# Patient Record
Sex: FEMALE | Race: OTHER | Employment: FULL TIME | URBAN - METROPOLITAN AREA
[De-identification: names, ages, dates, MRNs, and addresses within clinical notes are randomized per-mention and may not be internally consistent; named-entity substitution may affect disease eponyms.]

---
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
## 2025-03-18 ENCOUNTER — ANCILLARY PROCEDURE (OUTPATIENT)
Dept: OBGYN CLINIC | Age: 20
End: 2025-03-18

## 2025-03-18 ENCOUNTER — INITIAL PRENATAL (OUTPATIENT)
Dept: OBGYN CLINIC | Age: 20
End: 2025-03-18

## 2025-03-18 VITALS
DIASTOLIC BLOOD PRESSURE: 69 MMHG | TEMPERATURE: 98 F | HEIGHT: 64 IN | HEART RATE: 86 BPM | RESPIRATION RATE: 14 BRPM | SYSTOLIC BLOOD PRESSURE: 110 MMHG | WEIGHT: 223 LBS | BODY MASS INDEX: 38.07 KG/M2

## 2025-03-18 DIAGNOSIS — Z03.75 SUSPECTED CERVICAL SHORTENING NOT FOUND: ICD-10-CM

## 2025-03-18 DIAGNOSIS — Z36.3 ENCOUNTER FOR ANTENATAL SCREENING FOR MALFORMATION USING ULTRASOUND: Primary | ICD-10-CM

## 2025-03-18 DIAGNOSIS — Z36.3 ENCOUNTER FOR ROUTINE SCREENING FOR FETAL MALFORMATION USING ULTRASOUND: ICD-10-CM

## 2025-03-18 LAB
GLUCOSE URINE, POC: NORMAL MG/DL
PROTEIN UA: NEGATIVE

## 2025-03-18 PROCEDURE — 76811 OB US DETAILED SNGL FETUS: CPT | Performed by: OBSTETRICS & GYNECOLOGY

## 2025-03-18 PROCEDURE — 99203 OFFICE O/P NEW LOW 30 MIN: CPT | Performed by: OBSTETRICS & GYNECOLOGY

## 2025-03-18 PROCEDURE — 99999 PR OFFICE/OUTPT VISIT,PROCEDURE ONLY: CPT | Performed by: OBSTETRICS & GYNECOLOGY

## 2025-03-18 PROCEDURE — 81002 URINALYSIS NONAUTO W/O SCOPE: CPT | Performed by: OBSTETRICS & GYNECOLOGY

## 2025-03-18 PROCEDURE — 76817 TRANSVAGINAL US OBSTETRIC: CPT | Performed by: OBSTETRICS & GYNECOLOGY

## 2025-03-18 RX ORDER — ACETAMINOPHEN 325 MG/1
650 TABLET ORAL EVERY 6 HOURS PRN
COMMUNITY

## 2025-03-18 NOTE — PROGRESS NOTES
3/18/25    Chaka Ortiz MD  [facility], [address] [place] [address]     RE:  SANDRA العلي  : 2005   AGE: 19 y.o.      Dear Dr. Ortiz:    Sandra العلي was scheduled for a fetal ultrasound assessment only.  A comprehensive ultrasound report is included under the imaging tab from today's date.    A living epps intrauterine fetus was identified in the breech presentation, with normal fetal heart motion and normal fetal motion noted.  The amniotic fluid volume was within normal limits.  The estimated fetal weight was 372 grams.  The placenta was on the anterior surface of the uterus.  A three-vessel umbilical cord was normally inserted into the placenta.  The biometric measurements were consistent with the patient's established dating parameters, within the limits of error the test at the current gestational age.  No apparent gross fetal anatomic abnormalities were identified in the areas which were visualized.  The spine was not adequately visualized secondary to the fetal position.    Ultrasound is not diagnostic for fetal aneuploidy or other karyotype abnormalities, and may not detect all structural fetal defects, even if multiple examinations are performed during a  pregnancy.  Normal ultrasound findings did not equate with a normal fetal outcome.    Transvaginal ultrasound assessment of the cervix was performed. The cervical length was 45.5 mm, without funneling of the amniotic membranes.      A repeat ultrasound evaluation is recommended in approximately 3 to 4 weeks to reassess the fetal anatomy not visualized on today's examination and reevaluate the fetal growth.    If you have any questions regarding her management, please contact me at your convenience and thank you for allowing me to participate in her care    Sincerely,        Mason Damon MD, MS, FACOG, FACS, RDCS, RDMS, RVT  Director Maternal-Fetal Medicine  [facility]  [phone]

## 2025-03-18 NOTE — PROGRESS NOTES
Patient is here today for ob new visit. Denies any vaginal bleeding,  or leakage of fluids.  Lower stomach tightness when she is laying down on one side.

## 2025-04-09 ENCOUNTER — ROUTINE PRENATAL (OUTPATIENT)
Dept: OBGYN CLINIC | Age: 20
End: 2025-04-09

## 2025-04-09 ENCOUNTER — ANCILLARY PROCEDURE (OUTPATIENT)
Dept: OBGYN CLINIC | Age: 20
End: 2025-04-09
Payer: MEDICAID

## 2025-04-09 VITALS
SYSTOLIC BLOOD PRESSURE: 99 MMHG | HEART RATE: 76 BPM | TEMPERATURE: 98.1 F | WEIGHT: 226 LBS | DIASTOLIC BLOOD PRESSURE: 63 MMHG | BODY MASS INDEX: 38.79 KG/M2

## 2025-04-09 DIAGNOSIS — O99.210 OBESITY AFFECTING PREGNANCY, ANTEPARTUM, UNSPECIFIED OBESITY TYPE: Primary | ICD-10-CM

## 2025-04-09 DIAGNOSIS — Z36.9 ENCOUNTER FOR FETAL ULTRASOUND: ICD-10-CM

## 2025-04-09 LAB
GLUCOSE URINE, POC: NORMAL MG/DL
PROTEIN UA: NEGATIVE

## 2025-04-09 PROCEDURE — 81002 URINALYSIS NONAUTO W/O SCOPE: CPT | Performed by: OBSTETRICS & GYNECOLOGY

## 2025-04-09 PROCEDURE — 99999 PR OFFICE/OUTPT VISIT,PROCEDURE ONLY: CPT | Performed by: OBSTETRICS & GYNECOLOGY

## 2025-04-09 PROCEDURE — 76816 OB US FOLLOW-UP PER FETUS: CPT | Performed by: OBSTETRICS & GYNECOLOGY

## 2025-04-09 PROCEDURE — 99213 OFFICE O/P EST LOW 20 MIN: CPT | Performed by: OBSTETRICS & GYNECOLOGY

## 2025-04-09 NOTE — PROGRESS NOTES
Patient is here today for US. Denies any vaginal bleeding, cramping, or leakage of fluids.  Patient reports good fetal movement.

## 2025-04-10 NOTE — PROGRESS NOTES
25    Chaka Ortiz MD  [facility], [address]  [place] [address]     RE:  SANDRA العلي  : 2005   AGE: 19 y.o.      Dear Dr. Ortiz:    Sandra العلي was scheduled for a fetal ultrasound assessment only.  A comprehensive ultrasound report is included under the imaging tab from today's date.    A living epps intrauterine fetus was identified in the cephalic presentation, with normal fetal heart motion and normal fetal motion noted.  The amniotic fluid volume was within normal limits.  The estimated fetal weight was 625 grams, consistent with the 53rd growth percentile for gestational age.  The placenta was on the anterior wall of the uterus.  A three-vessel umbilical cord was normally inserted into the placenta.  The biometric measurements were consistent with the patient's established dating parameters, within the limits of error the test at the current gestational age.  No apparent gross fetal anatomic abnormalities were identified in the areas which were visualized.  The fetal spine was adequately visualized on today's evaluation.    Ultrasound is not diagnostic for fetal aneuploidy or other karyotype abnormalities, and may not detect all structural fetal defects, even if multiple examinations are performed during a  pregnancy.  Normal ultrasound findings did not equate with a normal fetal outcome.    A repeat scan is recommended in the third trimester of pregnancy, to reevaluate fetal anatomy and growth, unless there is a clinical indication to repeat the evaluation prior to that time.    If you have any questions regarding her management, please contact me at your convenience and thank you for allowing me to participate in her care    Sincerely,        Mason Damon MD, MS, FACOG, FACS, RDCS, RDMS, RVT  Director Maternal-Fetal Medicine  [facility]  [phone]